# Patient Record
Sex: FEMALE | Race: WHITE | NOT HISPANIC OR LATINO
[De-identification: names, ages, dates, MRNs, and addresses within clinical notes are randomized per-mention and may not be internally consistent; named-entity substitution may affect disease eponyms.]

---

## 2019-01-23 PROBLEM — Z00.00 ENCOUNTER FOR PREVENTIVE HEALTH EXAMINATION: Status: ACTIVE | Noted: 2019-01-23

## 2019-01-24 ENCOUNTER — APPOINTMENT (OUTPATIENT)
Dept: NEUROSURGERY | Facility: CLINIC | Age: 69
End: 2019-01-24
Payer: MEDICARE

## 2019-01-24 VITALS
BODY MASS INDEX: 27.48 KG/M2 | TEMPERATURE: 97 F | SYSTOLIC BLOOD PRESSURE: 120 MMHG | HEIGHT: 60 IN | RESPIRATION RATE: 16 BRPM | DIASTOLIC BLOOD PRESSURE: 77 MMHG | OXYGEN SATURATION: 100 % | HEART RATE: 95 BPM | WEIGHT: 140 LBS

## 2019-01-24 DIAGNOSIS — M54.16 RADICULOPATHY, LUMBAR REGION: ICD-10-CM

## 2019-01-24 DIAGNOSIS — Z56.0 UNEMPLOYMENT, UNSPECIFIED: ICD-10-CM

## 2019-01-24 DIAGNOSIS — Z81.8 FAMILY HISTORY OF OTHER MENTAL AND BEHAVIORAL DISORDERS: ICD-10-CM

## 2019-01-24 DIAGNOSIS — Z82.49 FAMILY HISTORY OF ISCHEMIC HEART DISEASE AND OTHER DISEASES OF THE CIRCULATORY SYSTEM: ICD-10-CM

## 2019-01-24 DIAGNOSIS — Z83.3 FAMILY HISTORY OF DIABETES MELLITUS: ICD-10-CM

## 2019-01-24 DIAGNOSIS — Z86.59 PERSONAL HISTORY OF OTHER MENTAL AND BEHAVIORAL DISORDERS: ICD-10-CM

## 2019-01-24 DIAGNOSIS — Z86.69 PERSONAL HISTORY OF OTHER DISEASES OF THE NERVOUS SYSTEM AND SENSE ORGANS: ICD-10-CM

## 2019-01-24 PROCEDURE — 99205 OFFICE O/P NEW HI 60 MIN: CPT

## 2019-01-24 RX ORDER — TRAMADOL HYDROCHLORIDE 50 MG/1
50 TABLET, COATED ORAL
Qty: 60 | Refills: 0 | Status: ACTIVE | COMMUNITY
Start: 2019-01-24 | End: 1900-01-01

## 2019-01-24 SDOH — ECONOMIC STABILITY - INCOME SECURITY: UNEMPLOYMENT, UNSPECIFIED: Z56.0

## 2019-01-25 RX ORDER — OXYCODONE AND ACETAMINOPHEN 5; 325 MG/1; MG/1
5-325 TABLET ORAL
Qty: 20 | Refills: 0 | Status: ACTIVE | COMMUNITY
Start: 2019-01-25 | End: 1900-01-01

## 2019-01-28 ENCOUNTER — OUTPATIENT (OUTPATIENT)
Dept: OUTPATIENT SERVICES | Facility: HOSPITAL | Age: 69
LOS: 1 days | End: 2019-01-28
Payer: COMMERCIAL

## 2019-01-28 DIAGNOSIS — M54.5 LOW BACK PAIN: ICD-10-CM

## 2019-01-28 DIAGNOSIS — Z22.321 CARRIER OR SUSPECTED CARRIER OF METHICILLIN SUSCEPTIBLE STAPHYLOCOCCUS AUREUS: ICD-10-CM

## 2019-01-28 DIAGNOSIS — M54.16 RADICULOPATHY, LUMBAR REGION: ICD-10-CM

## 2019-01-28 DIAGNOSIS — M71.38 OTHER BURSAL CYST, OTHER SITE: ICD-10-CM

## 2019-01-28 PROBLEM — Z83.3 FAMILY HISTORY OF DIABETES MELLITUS: Status: ACTIVE | Noted: 2019-01-24

## 2019-01-28 PROBLEM — Z82.49 FAMILY HISTORY OF HYPERTENSION: Status: ACTIVE | Noted: 2019-01-24

## 2019-01-28 PROBLEM — Z86.69 HISTORY OF GLAUCOMA: Status: RESOLVED | Noted: 2019-01-24 | Resolved: 2019-01-28

## 2019-01-28 PROBLEM — Z86.59 HISTORY OF ANXIETY: Status: RESOLVED | Noted: 2019-01-24 | Resolved: 2019-01-28

## 2019-01-28 PROBLEM — Z56.0 NOT CURRENTLY WORKING DUE TO DISABLED STATUS: Status: ACTIVE | Noted: 2019-01-24

## 2019-01-28 PROBLEM — Z81.8 FAMILY HISTORY OF DEPRESSION: Status: ACTIVE | Noted: 2019-01-24

## 2019-01-28 LAB
MRSA PCR RESULT.: NEGATIVE — SIGNIFICANT CHANGE UP
S AUREUS DNA NOSE QL NAA+PROBE: NEGATIVE — SIGNIFICANT CHANGE UP

## 2019-01-28 PROCEDURE — 87641 MR-STAPH DNA AMP PROBE: CPT

## 2019-01-28 RX ORDER — ASPIRIN 81 MG/1
81 TABLET, COATED ORAL
Refills: 0 | Status: ACTIVE | COMMUNITY

## 2019-01-28 RX ORDER — ALPRAZOLAM 1 MG/1
1 TABLET, EXTENDED RELEASE ORAL
Refills: 0 | Status: ACTIVE | COMMUNITY

## 2019-01-28 RX ORDER — OXYCODONE HYDROCHLORIDE AND ACETAMINOPHEN 10; 325 MG/1; MG/1
TABLET ORAL
Refills: 0 | Status: ACTIVE | COMMUNITY

## 2019-02-05 NOTE — REVIEW OF SYSTEMS
[Feeling Poorly] : feeling poorly [Leg Weakness] : leg weakness [Numbness] : numbness [Tingling] : tingling [Abnormal Sensation] : an abnormal sensation [Difficulty Walking] : difficulty walking [Sleep Disturbances] : sleep disturbances [Anxiety] : anxiety [Arthralgias] : arthralgias [Joint Pain] : joint pain [Joint Stiffness] : joint stiffness [Limb Pain] : limb pain [Negative] : Heme/Lymph [Feeling Tired] : not feeling tired [Joint Swelling] : no joint swelling [Limb Swelling] : no limb swelling

## 2019-02-05 NOTE — ADDENDUM
[FreeTextEntry1] : January 24, 2019\par \par Tu Saldivar MD\par [address]\[place] [address]\par \par Re:	Kimberly Weber \par \par Dear Dr. Saldivar:\par \par I saw Kimberly in the office today.  As you know, she is a healthy 68-year-old female who presents with refractory right leg sciatic-type pain primarily in the right posterior thigh, foot, and ankle with MRI evidence of a right-sided synovial cyst with neural foraminal compromise.\par \par Kimberly reports that this pain has been intractable and severe and is a “20/10.”  This has been going on and getting worse since September despite conservative management including nonsteroidals and injections.  She really has no other complaints.  She denies any neurologic deficit or weakness.  Other than some mild anxiety and glaucoma, she has no other medical problems.  She does take Xanax as well as some low-dose aspirin.  She has had no prior surgery.  Other than her pain and some leg weakness, she denies any neurological deficit and certainly denies any left-sided symptoms.  She denies any cardiovascular, respiratory, or GI complaints.\par \par On exam, Kimberly has no gross neurological deficit.  Her dorsiflexion and plantar flexion are intact.  She does develop pain in the right leg with ankle flexion, however.  The remainder of her exam is noncontributory. \par \par I did have the opportunity to review her lumbar MRI, which does in fact show a sizable right-sided L5-S1 synovial cyst with neural foraminal compromise.  This is clearly her symptom generator, and in general, these sorts of lesions do not improve with conservative management.\par \par I had a long discussion with Kimberly regarding the best way to proceed.  We did review the risks, benefits, and alternatives to surgery.  Given the refractory nature of her pain and synovial cyst, I do think a simple lumbar decompression makes sense.  I think, given the severity of her pain, that she would like to get on with this as soon as possible, and we will make every effort to satisfy her in the near future.\par \par I will certainly keep you abreast of her progress and appreciate your kind referral.\par \par Sincerely,\par \par \par \par Rafiq Vila MD\par \par DL/ag [facility] #[id number]\par \par CC:	Gregg Fitzpatrick MD\par 	[address] [facility]\[place] [address]\par 	\par 	Kanwal Foley MD\par 	[address] [facility]\[place] [address]\par

## 2019-02-05 NOTE — HISTORY OF PRESENT ILLNESS
[FreeTextEntry1] : Severe RIGHT SIDED Lowerback and leg pain [de-identified] : THis is a 68 year old woman here for comprehensive neurosurgical evaluation of severe Right sided lowerback pain radiating into her right leg.  The pain is severe and is unrelieved with NSAIDs and Physical therapy. Her workup MRI revealed LARGE RIGHT SIDED SYNOVIAL CYST. She reports ongoing difficulty with daily functions. She reports trace Motor weakness in her RIGHT LEG.  The pain is interfering with her quality of life.

## 2019-02-05 NOTE — ASSESSMENT
[FreeTextEntry1] : 1. Surgery Recommended: Lumbar Decompression of Synovial Cyst.\par Pt Scheduled and ready to proceed.\par MRSA SWAB completed\par [facility] PRoxy Completed\par Education provided regarding plan of care

## 2019-02-05 NOTE — PHYSICAL EXAM
[General Appearance - Alert] : alert [General Appearance - In No Acute Distress] : in no acute distress [Oriented To Time, Place, And Person] : oriented to person, place, and time [Impaired Insight] : insight and judgment were intact [Cranial Nerves Optic (II)] : visual acuity intact bilaterally,  pupils equal round and reactive to light [Cranial Nerves Oculomotor (III)] : extraocular motion intact [Cranial Nerves Trigeminal (V)] : facial sensation intact symmetrically [Cranial Nerves Facial (VII)] : face symmetrical [Cranial Nerves Vestibulocochlear (VIII)] : hearing was intact bilaterally [Cranial Nerves Glossopharyngeal (IX)] : tongue and palate midline [Cranial Nerves Accessory (XI - Cranial And Spinal)] : head turning and shoulder shrug symmetric [Cranial Nerves Hypoglossal (XII)] : there was no tongue deviation with protrusion [Motor Strength] : muscle strength was normal in all four extremities [Sensation Tactile Decrease] : light touch was intact [Limited Balance] : the patient's balance was impaired [Sclera] : the sclera and conjunctiva were normal [] : no respiratory distress [Heart Rate And Rhythm] : heart rate was normal and rhythm regular [Abdomen Soft] : soft [Skin Color & Pigmentation] : normal skin color and pigmentation [FreeTextEntry6] : Trace Motor weakness right leg

## 2019-02-15 ENCOUNTER — INPATIENT (INPATIENT)
Facility: HOSPITAL | Age: 69
LOS: 0 days | Discharge: HOME | DRG: 517 | End: 2019-02-16
Attending: NEUROLOGICAL SURGERY | Admitting: NEUROLOGICAL SURGERY
Payer: MEDICARE

## 2019-02-15 ENCOUNTER — RESULT REVIEW (OUTPATIENT)
Age: 69
End: 2019-02-15

## 2019-02-15 ENCOUNTER — TRANSCRIPTION ENCOUNTER (OUTPATIENT)
Age: 69
End: 2019-02-15

## 2019-02-15 VITALS
SYSTOLIC BLOOD PRESSURE: 117 MMHG | HEIGHT: 61 IN | RESPIRATION RATE: 18 BRPM | HEART RATE: 87 BPM | DIASTOLIC BLOOD PRESSURE: 73 MMHG | TEMPERATURE: 97 F | WEIGHT: 142.64 LBS | OXYGEN SATURATION: 98 %

## 2019-02-15 DIAGNOSIS — Z98.890 OTHER SPECIFIED POSTPROCEDURAL STATES: Chronic | ICD-10-CM

## 2019-02-15 PROCEDURE — 63267 EXCISE INTRSPINL LESION LMBR: CPT

## 2019-02-15 PROCEDURE — 69990 MICROSURGERY ADD-ON: CPT

## 2019-02-15 RX ORDER — ALPRAZOLAM 0.25 MG
1 TABLET ORAL THREE TIMES A DAY
Qty: 0 | Refills: 0 | Status: DISCONTINUED | OUTPATIENT
Start: 2019-02-15 | End: 2019-02-16

## 2019-02-15 RX ORDER — BUPIVACAINE 13.3 MG/ML
20 INJECTION, SUSPENSION, LIPOSOMAL INFILTRATION ONCE
Qty: 0 | Refills: 0 | Status: DISCONTINUED | OUTPATIENT
Start: 2019-02-15 | End: 2019-02-16

## 2019-02-15 RX ORDER — DIAZEPAM 5 MG
5 TABLET ORAL EVERY 6 HOURS
Qty: 0 | Refills: 0 | Status: DISCONTINUED | OUTPATIENT
Start: 2019-02-15 | End: 2019-02-16

## 2019-02-15 RX ORDER — ONDANSETRON 8 MG/1
4 TABLET, FILM COATED ORAL EVERY 6 HOURS
Qty: 0 | Refills: 0 | Status: DISCONTINUED | OUTPATIENT
Start: 2019-02-15 | End: 2019-02-16

## 2019-02-15 RX ORDER — OXYCODONE HYDROCHLORIDE 5 MG/1
5 TABLET ORAL EVERY 4 HOURS
Qty: 0 | Refills: 0 | Status: DISCONTINUED | OUTPATIENT
Start: 2019-02-15 | End: 2019-02-16

## 2019-02-15 RX ORDER — MULTIVIT-MIN/FERROUS GLUCONATE 9 MG/15 ML
1 LIQUID (ML) ORAL DAILY
Qty: 0 | Refills: 0 | Status: DISCONTINUED | OUTPATIENT
Start: 2019-02-15 | End: 2019-02-16

## 2019-02-15 RX ORDER — METHYLPREDNISOLONE 4 MG
500 TABLET ORAL DAILY
Qty: 0 | Refills: 0 | Status: DISCONTINUED | OUTPATIENT
Start: 2019-02-15 | End: 2019-02-15

## 2019-02-15 RX ORDER — CEFAZOLIN SODIUM 1 G
2000 VIAL (EA) INJECTION EVERY 8 HOURS
Qty: 0 | Refills: 0 | Status: DISCONTINUED | OUTPATIENT
Start: 2019-02-15 | End: 2019-02-15

## 2019-02-15 RX ORDER — CEFAZOLIN SODIUM 1 G
2000 VIAL (EA) INJECTION EVERY 8 HOURS
Qty: 0 | Refills: 0 | Status: COMPLETED | OUTPATIENT
Start: 2019-02-15 | End: 2019-02-16

## 2019-02-15 RX ORDER — HYDROMORPHONE HYDROCHLORIDE 2 MG/ML
0.5 INJECTION INTRAMUSCULAR; INTRAVENOUS; SUBCUTANEOUS ONCE
Qty: 0 | Refills: 0 | Status: DISCONTINUED | OUTPATIENT
Start: 2019-02-15 | End: 2019-02-16

## 2019-02-15 RX ORDER — ACETAMINOPHEN 500 MG
650 TABLET ORAL EVERY 6 HOURS
Qty: 0 | Refills: 0 | Status: DISCONTINUED | OUTPATIENT
Start: 2019-02-15 | End: 2019-02-16

## 2019-02-15 RX ORDER — DEXTROSE MONOHYDRATE, SODIUM CHLORIDE, AND POTASSIUM CHLORIDE 50; .745; 4.5 G/1000ML; G/1000ML; G/1000ML
1000 INJECTION, SOLUTION INTRAVENOUS
Qty: 0 | Refills: 0 | Status: DISCONTINUED | OUTPATIENT
Start: 2019-02-15 | End: 2019-02-16

## 2019-02-15 RX ADMIN — Medication 2000 MILLIGRAM(S): at 16:07

## 2019-02-15 RX ADMIN — DEXTROSE MONOHYDRATE, SODIUM CHLORIDE, AND POTASSIUM CHLORIDE 75 MILLILITER(S): 50; .745; 4.5 INJECTION, SOLUTION INTRAVENOUS at 11:44

## 2019-02-15 RX ADMIN — DEXTROSE MONOHYDRATE, SODIUM CHLORIDE, AND POTASSIUM CHLORIDE 75 MILLILITER(S): 50; .745; 4.5 INJECTION, SOLUTION INTRAVENOUS at 23:28

## 2019-02-15 RX ADMIN — ONDANSETRON 4 MILLIGRAM(S): 8 TABLET, FILM COATED ORAL at 20:15

## 2019-02-15 RX ADMIN — Medication 1 MILLIGRAM(S): at 16:04

## 2019-02-15 RX ADMIN — Medication 2000 MILLIGRAM(S): at 23:28

## 2019-02-15 NOTE — H&P PST ADULT - ASSESSMENT
68 year old female here for right L5-S1 laminectomy for synovial cyst    -PACU then transfer to regional bed when stable  -Pain control  -Physical therapy

## 2019-02-15 NOTE — DISCHARGE NOTE ADULT - CARE PLAN
Principal Discharge DX:	Synovial cyst of lumbar spine  Goal:	resume ADLs  Assessment and plan of treatment:	call to schedule outpatient follow up with Dr. Vila in 10-14 days for wound check Principal Discharge DX:	Synovial cyst of lumbar spine  Goal:	resume ADLs  Assessment and plan of treatment:	Keep your wound clean and dry.  Once a day, ask a family member to check your incision for signs of infection such as: Redness, swelling, tenderness, drainage  • You may use stairs as tolerated.  • You may shower briefly, unless you told not to by your doctor.   Cover your entire incision with a waterproof bandage to make sure it does not get wet. If it gets wet, dry it off.   No tub baths or swimming for two weeks.  Call the office to schedule a outpatient follow-up with Dr. Vila in 10-14 days for wound check     Take pain medicine as prescribed.   You may find it helpful to take it for morning stiffness or for soreness when trying to sleep.  o Pain medication can cause constipation. Eating food with fiber such as fruits and  vegetables, and drinking liquids may help prevent constipation.  • Avoid bending, twisting or heavy lifting.  • Do not sit for more than 20 minutes each time you sit.  • Do not wear pants that are tight on your incision.  Call you doctor immediately if you have:  • Any new numbness, tingling, or weakness in your arms and legs. Worsening pain not responsive to pain meds, Fever of 101° F or more.

## 2019-02-15 NOTE — DISCHARGE NOTE ADULT - PLAN OF CARE
resume ADLs call to schedule outpatient follow up with Dr. Vila in 10-14 days for wound check Keep your wound clean and dry.  Once a day, ask a family member to check your incision for signs of infection such as: Redness, swelling, tenderness, drainage  • You may use stairs as tolerated.  • You may shower briefly, unless you told not to by your doctor.   Cover your entire incision with a waterproof bandage to make sure it does not get wet. If it gets wet, dry it off.   No tub baths or swimming for two weeks.  Call the office to schedule a outpatient follow-up with Dr. Vila in 10-14 days for wound check     Take pain medicine as prescribed.   You may find it helpful to take it for morning stiffness or for soreness when trying to sleep.  o Pain medication can cause constipation. Eating food with fiber such as fruits and  vegetables, and drinking liquids may help prevent constipation.  • Avoid bending, twisting or heavy lifting.  • Do not sit for more than 20 minutes each time you sit.  • Do not wear pants that are tight on your incision.  Call you doctor immediately if you have:  • Any new numbness, tingling, or weakness in your arms and legs. Worsening pain not responsive to pain meds, Fever of 101° F or more.

## 2019-02-15 NOTE — H&P PST ADULT - HISTORY OF PRESENT ILLNESS
This is a 68 year old woman with pmhx of anxiety and glaucoma. She p/w right low back pain with RLE radiculopathy. Pain is severe and unrelieved with NSAIDS or physical therapy. MRI revealed a right L5-S1 synovial cyst. She reports trace preoperative motor weakness in right leg. She is scheduled today for a R L5-S1 laminectomy for synovial cyst.

## 2019-02-15 NOTE — DISCHARGE NOTE ADULT - MEDICATION SUMMARY - MEDICATIONS TO TAKE
I will START or STAY ON the medications listed below when I get home from the hospital:    oxyCODONE 5 mg oral tablet  -- 2 tab(s) by mouth every 6 hours, As Needed -Moderate Pain (4 - 6) MDD:12 tabs  -- Indication: For Severe pain    diazePAM 5 mg oral tablet  -- 1 tab(s) by mouth every 6 hours, As needed, muscle spasm MDD:4 tabs  -- Indication: For Muscle spasm    Xanax 1 mg oral tablet  -- 1 tab(s) by mouth 3 times a day  -- Indication: For Anxiety    magnesium gluconate  -- Indication: For Nutritional    Centrum Silver oral tablet  -- 1 tab(s) by mouth once a day  -- Indication: For Bone strength

## 2019-02-15 NOTE — PROGRESS NOTE ADULT - SUBJECTIVE AND OBJECTIVE BOX
NEUROSURGERY POC:  S/P day 1 L5-S1 laminectomy for resection of synovial cyst. Patient in PACU without complaint. States that her pain level is zero at this time. States that pre op leg pain is resolved.    T(C): 36.5 (02-15-19 @ 12:00), Max: 36.5 (02-15-19 @ 12:00)  HR: 66 (02-15-19 @ 12:00) (66 - 96)  BP: 111/62 (02-15-19 @ 12:00) (102/59 - 117/73)  RR: 10 (02-15-19 @ 12:00) (10 - 20)  SpO2: 99% (02-15-19 @ 12:00) (97% - 100%)  Wt(kg): --    Exam: A&Ox3, PERRL, EOMI.  Back: Surgical dressing is dry and clean.  Ext: No focal  motor deficit, 5/5 x 4  No sensory deficit to touch.    Wound: dry and clean as above.    Imaging: none.    Assessment/Plan: 68 female s/p day zero L5-S1 laminectomy, resection of synovial cyst.  Plan: Pain control  OOB ambulate, PT.  discharge home tomorrow.  CHANCE. Dr. Vila.

## 2019-02-15 NOTE — DISCHARGE NOTE ADULT - PATIENT PORTAL LINK FT
You can access the [facility] Patient Portal, offered by [facility], by registering with the following website: [email or web address]

## 2019-02-15 NOTE — CONSULT NOTE ADULT - SUBJECTIVE AND OBJECTIVE BOX
Patient is a 68y old  Female who presents with a chief complaint of  back apin and radicular symptoms in the LE- no foot drop      HPI:      Allergies  No Known Allergies      Health Issues  SYNOCYST M71.38  Anxiety  Status post Mohs surgery  History of hand surgery        FAMILY HISTORY:      MEDICATIONS  (STANDING):    MEDICATIONS  (PRN):      PAST MEDICAL & SURGICAL HISTORY:  Anxiety  Status post Mohs surgery: left eye  History of hand surgery: right      Labs              Radiology:    Physical Exam    MENTAL STATUS  -Level of Consciousness- awake    Orientation- person, place time  Language- aphasia/ dysarthria- nl  Memory- recent and remote- nl      Cranial Nerve 1- 12  Pupils- equal and reactive  Eye movements- full  Facial - no asymmetry   Lower CN-nl    Gait and Station- no foot drop    MOTOR  Upper-nl  Lower- Left L5 minor weakness    Reflexes- intact    Sensation- no sensory level    Cerebellar- no tremors    vascular - no bruits    Assessment- Synovial cyst and radicular symptoms     Plan  Surgery as per Dr morgan

## 2019-02-15 NOTE — DISCHARGE NOTE ADULT - HOSPITAL COURSE
Patient admitted for elective lumbar decompression and resection of synovial cyst without complication Post op extubated and taken to PACU for observation. Patient later transferred to floor for observation and pain control overnight. Patient tolerating Po intake, ambulation and pain control. Patient cleared to [place] home instructed to follow up with Dr. Vila in 10-14 days for wound check. Patient admitted for elective lumbar decompression and resection of synovial cyst without complication. Post op extubated and taken to PACU for observation. Patient later transferred to floor for observation and pain control overnight. Patient tolerating PO intake, ambulation and pain control. Patient cleared to [place] home instructed to follow up with Dr. Vila in 10-14 days for wound check.

## 2019-02-15 NOTE — DISCHARGE NOTE ADULT - NS AS ACTIVITY OBS
Showering allowed/Walking-Indoors allowed/Bathing allowed/Walking-Outdoors allowed/Stairs allowed/No Heavy lifting/straining

## 2019-02-15 NOTE — DISCHARGE NOTE ADULT - CARE PROVIDER_API CALL
Rafiq Vila)  Neurological Surgery  [address], [place] [facility]  Phone: [phone]  Fax: [phone]  Follow Up Time:

## 2019-02-16 VITALS
SYSTOLIC BLOOD PRESSURE: 116 MMHG | RESPIRATION RATE: 15 BRPM | HEART RATE: 88 BPM | TEMPERATURE: 96 F | OXYGEN SATURATION: 97 % | DIASTOLIC BLOOD PRESSURE: 70 MMHG

## 2019-02-16 RX ORDER — OXYCODONE HYDROCHLORIDE 5 MG/1
2 TABLET ORAL
Qty: 56 | Refills: 0 | OUTPATIENT
Start: 2019-02-16 | End: 2019-02-22

## 2019-02-16 RX ORDER — DIAZEPAM 5 MG
1 TABLET ORAL
Qty: 28 | Refills: 0 | OUTPATIENT
Start: 2019-02-16 | End: 2019-02-22

## 2019-02-16 RX ADMIN — OXYCODONE HYDROCHLORIDE 5 MILLIGRAM(S): 5 TABLET ORAL at 08:59

## 2019-02-16 RX ADMIN — Medication 2000 MILLIGRAM(S): at 07:19

## 2019-02-16 RX ADMIN — OXYCODONE HYDROCHLORIDE 5 MILLIGRAM(S): 5 TABLET ORAL at 08:30

## 2019-02-16 NOTE — PROGRESS NOTE ADULT - SUBJECTIVE AND OBJECTIVE BOX
HPI:  This is a 68 year old woman with pmhx of anxiety and glaucoma. She p/w right low back pain with RLE radiculopathy. Pain is severe and unrelieved with NSAIDS or physical therapy. MRI revealed a right L5-S1 synovial cyst. She reports trace preoperative motor weakness in right leg. She is scheduled today for a R L5-S1 laminectomy for synovial cyst. (15 Feb 2019 10:25)      2/15/19: S/P day 1 L5-S1 laminectomy for resection of synovial cyst. Patient in PACU without complaint. States that her pain level is zero at this time. States that pre op leg pain is resolved.  2/16/19: RAVIN overnight. Neuro stable.    Vital Signs Last 24 Hrs  T(C): 35.6 (16 Feb 2019 05:04), Max: 37.3 (15 Feb 2019 19:06)  T(F): 96 (16 Feb 2019 05:04), Max: 99.2 (15 Feb 2019 19:06)  HR: 90 (16 Feb 2019 05:04) (66 - 108)  BP: 114/71 (16 Feb 2019 05:04) (102/59 - 144/75)  BP(mean): 77 (15 Feb 2019 17:00) (74 - 98)  RR: 16 (16 Feb 2019 05:04) (10 - 20)  SpO2: 96% (16 Feb 2019 05:04) (94% - 100%)    I&O's Summary    15 Feb 2019 07:01  -  16 Feb 2019 06:19  --------------------------------------------------------  IN: 885 mL / OUT: 700 mL / NET: 185 mL        PHYSICAL EXAM:  Neurological: AAOx3, FC, speech coherent  CNII-XII: PERRL, EOMI, face symmetric  Motor: MAEx4 5/5 UE and LE B/L  Back: Surgical dressing is dry and clean.  SILT throughout    TUBES/LINES:  [] Morales  [] Lumbar Drain  [] Wound Drains  [] Others    DIET:  [] NPO  [x] Mechanical  [] Tube feeds    LABS:        CAPILLARY BLOOD GLUCOSE          Drug Levels: [] N/A    CSF Analysis: [] N/A      Allergies    No Known Allergies    Intolerances      MEDICATIONS:  Antibiotics:  ceFAZolin  Injectable. 2000 milliGRAM(s) IV Push every 8 hours    Neuro:  acetaminophen   Tablet .. 650 milliGRAM(s) Oral every 6 hours PRN  ALPRAZolam 1 milliGRAM(s) Oral three times a day PRN  diazepam    Tablet 5 milliGRAM(s) Oral every 6 hours PRN  HYDROmorphone  Injectable 0.5 milliGRAM(s) IV Push once PRN  ondansetron Injectable 4 milliGRAM(s) IV Push every 6 hours PRN  oxyCODONE    IR 5 milliGRAM(s) Oral every 4 hours PRN    Anticoagulation:    OTHER:  BUpivacaine liposome 1.3% Injectable (no eMAR) 20 milliLiter(s) Local Injection once    IVF:  multivitamin/minerals 1 Tablet(s) Oral daily  sodium chloride 0.9% with potassium chloride 20 mEq/L 1000 milliLiter(s) IV Continuous <Continuous>    CULTURES:    RADIOLOGY & ADDITIONAL TESTS:      ASSESSMENT:  68 female s/p day zero L5-S1 laminectomy, resection of synovial cyst.    PLAN:  -spinal checks  -pain control: valium, oxycodone  -PT/OOB  -DVT ppx: SCDs  -enocurage IS  -bowel regimen  -ADAT  -continue postop ancef  -Dispo pending  -D/w Dr. Vila

## 2019-02-16 NOTE — PHYSICAL THERAPY INITIAL EVALUATION ADULT - PERTINENT HX OF CURRENT PROBLEM, REHAB EVAL
his is a 68 year old woman with pmhx of anxiety and glaucoma. She p/w right low back pain with RLE radiculopathy. Pain is severe and unrelieved with NSAIDS or physical therapy. MRI revealed a right L5-S1 synovial cyst. She reports trace preoperative motor weakness in right leg. She is scheduled today for a R L5-S1 laminectomy for synovial cyst.

## 2019-02-16 NOTE — PROGRESS NOTE ADULT - SUBJECTIVE AND OBJECTIVE BOX
Neurology Follow up note    Name  VALERIA SO    HPI:  This is a 68 year old woman with pmhx of anxiety and glaucoma. She p/w right low back pain with RLE radiculopathy. Pain is severe and unrelieved with NSAIDS or physical therapy. MRI revealed a right L5-S1 synovial cyst. She reports trace preoperative motor weakness in right leg. She is scheduled today for a R L5-S1 laminectomy for synovial cyst. (15 Feb 2019 10:25)      Interval History - back pain - no new radicular symptoms in the LE        REVIEW OF SYSTEMS    Vital Signs Last 24 Hrs  T(C): 35.6 (16 Feb 2019 08:21), Max: 37.3 (15 Feb 2019 19:06)  T(F): 96 (16 Feb 2019 08:21), Max: 99.2 (15 Feb 2019 19:06)  HR: 88 (16 Feb 2019 08:21) (77 - 108)  BP: 116/70 (16 Feb 2019 08:21) (104/64 - 144/75)  BP(mean): 77 (15 Feb 2019 17:00) (74 - 98)  RR: 15 (16 Feb 2019 08:21) (11 - 19)  SpO2: 97% (16 Feb 2019 08:21) (94% - 99%)    Physical Exam-     Mental Status- awake and alert    Cranial Nerves- full EOM    Gait and station- n/a    Motor- moves all 4 extremities    Reflexes- decreased    Sensation- no sensory level    Coordination- no tremors    Vascular - no bruits    Medications  acetaminophen   Tablet .. 650 milliGRAM(s) Oral every 6 hours PRN  ALPRAZolam 1 milliGRAM(s) Oral three times a day PRN  BUpivacaine liposome 1.3% Injectable (no eMAR) 20 milliLiter(s) Local Injection once  diazepam    Tablet 5 milliGRAM(s) Oral every 6 hours PRN  HYDROmorphone  Injectable 0.5 milliGRAM(s) IV Push once PRN  multivitamin/minerals 1 Tablet(s) Oral daily  ondansetron Injectable 4 milliGRAM(s) IV Push every 6 hours PRN  oxyCODONE    IR 5 milliGRAM(s) Oral every 4 hours PRN      Lab      Radiology    Assessment- Synovial cyst    Plan as per NS- no neuro deficits

## 2019-02-16 NOTE — PHYSICAL THERAPY INITIAL EVALUATION ADULT - ADDITIONAL COMMENTS
Patient lives in [facility], 3 flights of stairs. Patient denies home health assistance, DME, or history of falls.

## 2019-02-18 PROCEDURE — 86850 RBC ANTIBODY SCREEN: CPT

## 2019-02-18 PROCEDURE — C1889: CPT

## 2019-02-18 PROCEDURE — 86901 BLOOD TYPING SEROLOGIC RH(D): CPT

## 2019-02-18 PROCEDURE — 97161 PT EVAL LOW COMPLEX 20 MIN: CPT

## 2019-02-18 PROCEDURE — 76000 FLUOROSCOPY <1 HR PHYS/QHP: CPT

## 2019-02-18 PROCEDURE — 88304 TISSUE EXAM BY PATHOLOGIST: CPT

## 2019-02-18 PROCEDURE — 86900 BLOOD TYPING SEROLOGIC ABO: CPT

## 2019-02-19 PROBLEM — F41.9 ANXIETY DISORDER, UNSPECIFIED: Chronic | Status: ACTIVE | Noted: 2019-02-14

## 2019-02-21 DIAGNOSIS — M71.38 OTHER BURSAL CYST, OTHER SITE: ICD-10-CM

## 2019-02-21 DIAGNOSIS — F41.9 ANXIETY DISORDER, UNSPECIFIED: ICD-10-CM

## 2019-02-21 DIAGNOSIS — H40.9 UNSPECIFIED GLAUCOMA: ICD-10-CM

## 2019-02-22 LAB — SURGICAL PATHOLOGY STUDY: SIGNIFICANT CHANGE UP

## 2019-02-27 ENCOUNTER — APPOINTMENT (OUTPATIENT)
Dept: NEUROSURGERY | Facility: CLINIC | Age: 69
End: 2019-02-27
Payer: MEDICARE

## 2019-02-27 VITALS
SYSTOLIC BLOOD PRESSURE: 136 MMHG | TEMPERATURE: 97.5 F | WEIGHT: 140 LBS | HEART RATE: 95 BPM | OXYGEN SATURATION: 100 % | BODY MASS INDEX: 27.48 KG/M2 | HEIGHT: 60 IN | RESPIRATION RATE: 16 BRPM | DIASTOLIC BLOOD PRESSURE: 82 MMHG

## 2019-02-27 PROCEDURE — 99024 POSTOP FOLLOW-UP VISIT: CPT

## 2019-03-01 NOTE — REASON FOR VISIT
[de-identified] : S/P L5-S1 LAMINOTOMY with resection of synovial cyst [de-identified] : 2/15/19 [de-identified] : doing well with improved/resolved leg pain.\par ambulating without difficulty.\par Lumbar incision healing well without signs of infection.

## 2019-03-01 NOTE — ASSESSMENT
[FreeTextEntry1] : S/P Laminotomy\par Doing well post op \par Wound edges approximated without signs of infection.\par Pain Well controlled with post op medications.\par Scheduled to follow up with Dr. Vila\par \par

## 2019-03-01 NOTE — PHYSICAL EXAM
[General Appearance - Alert] : alert [General Appearance - In No Acute Distress] : in no acute distress [Longitudinal] : longitudinal [Clean] : clean [Dry] : dry [Well-Healed] : well-healed [No Drainage] : without drainage [Normal Skin] : normal [Oriented To Time, Place, And Person] : oriented to person, place, and time [Impaired Insight] : insight and judgment were intact [I: Normal Smell] : smell intact bilaterally [Cranial Nerves Optic (II)] : visual acuity intact bilaterally,  pupils equal round and reactive to light [Cranial Nerves Oculomotor (III)] : extraocular motion intact [Cranial Nerves Trigeminal (V)] : facial sensation intact symmetrically [Cranial Nerves Facial (VII)] : face symmetrical [Cranial Nerves Glossopharyngeal (IX)] : tongue and palate midline [Cranial Nerves Accessory (XI - Cranial And Spinal)] : head turning and shoulder shrug symmetric [Cranial Nerves Hypoglossal (XII)] : there was no tongue deviation with protrusion [Motor Tone] : muscle tone was normal in all four extremities [Motor Strength] : muscle strength was normal in all four extremities [Motor Handedness Right-Handed] : the patient is right hand dominant [Sensation Tactile Decrease] : light touch was intact [Balance] : balance was intact [No Visual Abnormalities] : no visible abnormailities [Sclera] : the sclera and conjunctiva were normal [Outer Ear] : the ears and nose were normal in appearance [Neck Appearance] : the appearance of the neck was normal [] : no respiratory distress [Heart Rate And Rhythm] : heart rate was normal and rhythm regular [Abdomen Soft] : soft [Abnormal Walk] : normal gait [Skin Color & Pigmentation] : normal skin color and pigmentation [FreeTextEntry1] : lumbar

## 2019-03-25 PROBLEM — M54.5 LUMBAR BACK PAIN: Status: ACTIVE | Noted: 2019-01-24

## 2019-03-25 PROBLEM — Z09 POSTOP CHECK: Status: ACTIVE | Noted: 2019-03-01

## 2019-03-25 PROBLEM — M71.38 SYNOVIAL CYST OF LUMBAR SPINE: Status: ACTIVE | Noted: 2019-01-24

## 2019-03-28 ENCOUNTER — APPOINTMENT (OUTPATIENT)
Dept: NEUROSURGERY | Facility: CLINIC | Age: 69
End: 2019-03-28
Payer: MEDICARE

## 2019-03-28 VITALS
OXYGEN SATURATION: 99 % | DIASTOLIC BLOOD PRESSURE: 76 MMHG | HEIGHT: 60 IN | WEIGHT: 140 LBS | HEART RATE: 77 BPM | BODY MASS INDEX: 27.48 KG/M2 | TEMPERATURE: 97.1 F | SYSTOLIC BLOOD PRESSURE: 112 MMHG | RESPIRATION RATE: 18 BRPM

## 2019-03-28 DIAGNOSIS — Z09 ENCOUNTER FOR FOLLOW-UP EXAMINATION AFTER COMPLETED TREATMENT FOR CONDITIONS OTHER THAN MALIGNANT NEOPLASM: ICD-10-CM

## 2019-03-28 DIAGNOSIS — M71.38 OTHER BURSAL CYST, OTHER SITE: ICD-10-CM

## 2019-03-28 DIAGNOSIS — M54.5 LOW BACK PAIN: ICD-10-CM

## 2019-03-28 PROCEDURE — 99024 POSTOP FOLLOW-UP VISIT: CPT

## 2019-04-02 NOTE — REASON FOR VISIT
[de-identified] : S/P L5-S1 LAMINOTOMY with resection of synovial cyst [de-identified] : 2/15/19 [de-identified] : doing well with improved/resolved leg pain.\par ambulating without difficulty.\par Lumbar incision healing well without signs of infection.

## 2019-04-02 NOTE — ADDENDUM
[FreeTextEntry1] : \par Tu Saldivar MD\par [address]\[place] [address]\par \par Re:	Kimberly Weber \par \par Dear Dr. Saldivar:\par \par I saw Kimberly in followup.  I am happy to say that her leg pain is resolved completely, although she continues to have low-grade back pain especially with prolonged sitting.  I am concerned that Kimberly’s mechanical issues which were not addressed by the synovial cyst resection could be persistent, and while I am hopeful that this will resolve over time, it is entirely possible that she could be left with a back pain syndrome given the presence of the synovial cyst.  These cysts are often caused by mechanical issues in the low back, and while removing the cyst treats her neural compression and leg pain, we did not address her mechanical issue at all.  I am hopeful that with time this will resolve, but clearly if her back pain becomes more disabling, a lumbar fusion could be necessary, but I am very hopeful we can avoid this.  I told Kimberly so.  I will be seeing her again in 3 months’ time and I will certainly keep a close eye on her progress.  She is much improved from surgery, where she was having “20 out of 10 pain,” but for the time being, we just need to keep an eye and hope that she remains stable.\par \par Thanks so much for your kind referral. \par \par Sincerely,\par \par \par \par Rafiq Vila MD\par \par DL/ag DocuMed #[id number]\par \par \par CC:	Gregg Fitzpatrick MD\par 	[address] [facility]\[place] [address]\par 	\par 	Kanwal Foley MD\par 	[address] [place] [address]\par

## 2019-04-02 NOTE — ASSESSMENT
[FreeTextEntry1] : Doing well post op \par Wound edges approximated without signs of infection.\par Pain Well controlled with post op medications.\par Scheduled to follow up with Dr. Vila\par

## 2019-04-11 NOTE — PHYSICAL EXAM
[General Appearance - Alert] : alert [General Appearance - In No Acute Distress] : in no acute distress [Longitudinal] : longitudinal [Dry] : dry [Clean] : clean [Well-Healed] : well-healed [No Drainage] : without drainage [Normal Skin] : normal [Impaired Insight] : insight and judgment were intact [FreeTextEntry1] : lumbar  [Oriented To Time, Place, And Person] : oriented to person, place, and time [Cranial Nerves Oculomotor (III)] : extraocular motion intact [I: Normal Smell] : smell intact bilaterally [Cranial Nerves Optic (II)] : visual acuity intact bilaterally,  pupils equal round and reactive to light [Cranial Nerves Facial (VII)] : face symmetrical [Cranial Nerves Glossopharyngeal (IX)] : tongue and palate midline [Cranial Nerves Trigeminal (V)] : facial sensation intact symmetrically [Motor Tone] : muscle tone was normal in all four extremities [Cranial Nerves Hypoglossal (XII)] : there was no tongue deviation with protrusion [Cranial Nerves Accessory (XI - Cranial And Spinal)] : head turning and shoulder shrug symmetric [Motor Strength] : muscle strength was normal in all four extremities [Sensation Tactile Decrease] : light touch was intact [Motor Handedness Right-Handed] : the patient is right hand dominant [Balance] : balance was intact [Sclera] : the sclera and conjunctiva were normal [No Visual Abnormalities] : no visible abnormailities [Neck Appearance] : the appearance of the neck was normal [Outer Ear] : the ears and nose were normal in appearance [] : no respiratory distress [Heart Rate And Rhythm] : heart rate was normal and rhythm regular [Abnormal Walk] : normal gait [Abdomen Soft] : soft [Skin Color & Pigmentation] : normal skin color and pigmentation

## 2019-04-11 NOTE — REASON FOR VISIT
[de-identified] : S/P L5-S1 LAMINOTOMY with resection of synovial cyst [de-identified] : 2/15/19 [de-identified] : doing well with improved/resolved leg pain.\par ambulating without difficulty.\par Lumbar incision healing well without signs of infection.

## 2019-04-11 NOTE — ADDENDUM
[FreeTextEntry1] : \par Tu Saldivar MD\par [address]\[place] [address]\par \par Re:	Kimberly Weber \par \par Dear Dr. Saldivar:\par \par I saw Kimberly in followup.  I am happy to say that her leg pain is resolved completely, although she continues to have low-grade back pain especially with prolonged sitting.  I am concerned that Kimberly’s mechanical issues which were not addressed by the synovial cyst resection could be persistent, and while I am hopeful that this will resolve over time, it is entirely possible that she could be left with a back pain syndrome given the presence of the synovial cyst.  These cysts are often caused by mechanical issues in the low back, and while removing the cyst treats her neural compression and leg pain, we did not address her mechanical issue at all.  I am hopeful that with time this will resolve, but clearly if her back pain becomes more disabling, a lumbar fusion could be necessary, but I am very hopeful we can avoid this.  I told Kimberly so.  I will be seeing her again in 3 months’ time and I will certainly keep a close eye on her progress.  She is much improved from surgery, where she was having “20 out of 10 pain,” but for the time being, we just need to keep an eye and hope that she remains stable.\par \par Thanks so much for your kind referral. \par \par Sincerely,\par \par \par \par Rafiq Vila MD\par \par DL/ag [facility] #[id number]\par \par \par CC:	Gregg Fitzpatrick MD\par 	[address] [facility]\par 	[place] [address]\par 	\par 	Kanwal Foley MD\par 	[address] [place] [address]\par

## 2019-04-11 NOTE — ADDENDUM
[FreeTextEntry1] : \par Tu Saldivar MD\par [address]\[place] [address]\par \par Re:	Kimberly Weber \par \par Dear Dr. Saldivar:\par \par I saw Kimberly in followup.  I am happy to say that her leg pain is resolved completely, although she continues to have low-grade back pain especially with prolonged sitting.  I am concerned that Kimberly’s mechanical issues which were not addressed by the synovial cyst resection could be persistent, and while I am hopeful that this will resolve over time, it is entirely possible that she could be left with a back pain syndrome given the presence of the synovial cyst.  These cysts are often caused by mechanical issues in the low back, and while removing the cyst treats her neural compression and leg pain, we did not address her mechanical issue at all.  I am hopeful that with time this will resolve, but clearly if her back pain becomes more disabling, a lumbar fusion could be necessary, but I am very hopeful we can avoid this.  I told Kimberly so.  I will be seeing her again in 3 months’ time and I will certainly keep a close eye on her progress.  She is much improved from surgery, where she was having “20 out of 10 pain,” but for the time being, we just need to keep an eye and hope that she remains stable.\par \par Thanks so much for your kind referral. \par \par Sincerely,\par \par \par \par Rafiq Vila MD\par \par DL/ag [facility] #[id number]\par \par \par CC:	Gregg Fitzpatrick MD\par 	[address] [facility]\[place] 	[place] [address]\par 	\par 	Kanwal Foley MD\par 	[address] [place] [address]\par

## 2019-04-11 NOTE — REASON FOR VISIT
[de-identified] : 2/15/19 [de-identified] : S/P L5-S1 LAMINOTOMY with resection of synovial cyst [de-identified] : doing well with improved/resolved leg pain.\par ambulating without difficulty.\par Lumbar incision healing well without signs of infection.

## 2019-04-11 NOTE — PHYSICAL EXAM
[Longitudinal] : longitudinal [General Appearance - In No Acute Distress] : in no acute distress [General Appearance - Alert] : alert [Clean] : clean [Well-Healed] : well-healed [Dry] : dry [Normal Skin] : normal [No Drainage] : without drainage [Impaired Insight] : insight and judgment were intact [Oriented To Time, Place, And Person] : oriented to person, place, and time [FreeTextEntry1] : lumbar  [I: Normal Smell] : smell intact bilaterally [Cranial Nerves Optic (II)] : visual acuity intact bilaterally,  pupils equal round and reactive to light [Cranial Nerves Oculomotor (III)] : extraocular motion intact [Cranial Nerves Trigeminal (V)] : facial sensation intact symmetrically [Cranial Nerves Glossopharyngeal (IX)] : tongue and palate midline [Cranial Nerves Facial (VII)] : face symmetrical [Motor Tone] : muscle tone was normal in all four extremities [Cranial Nerves Hypoglossal (XII)] : there was no tongue deviation with protrusion [Cranial Nerves Accessory (XI - Cranial And Spinal)] : head turning and shoulder shrug symmetric [Motor Handedness Right-Handed] : the patient is right hand dominant [Sensation Tactile Decrease] : light touch was intact [Motor Strength] : muscle strength was normal in all four extremities [Balance] : balance was intact [No Visual Abnormalities] : no visible abnormailities [Sclera] : the sclera and conjunctiva were normal [Neck Appearance] : the appearance of the neck was normal [Outer Ear] : the ears and nose were normal in appearance [] : no respiratory distress [Heart Rate And Rhythm] : heart rate was normal and rhythm regular [Abdomen Soft] : soft [Abnormal Walk] : normal gait [Skin Color & Pigmentation] : normal skin color and pigmentation

## 2020-05-26 NOTE — PRE-OP CHECKLIST - TAMPON REMOVED
Called to discuss positive Chlamydia results. [address] [facility]. Health Department tracking form completed and faxed to D.
n/a

## 2024-08-13 ENCOUNTER — APPOINTMENT (RX ONLY)
Dept: URBAN - METROPOLITAN AREA CLINIC 102 | Facility: CLINIC | Age: 74
Setting detail: DERMATOLOGY
End: 2024-08-13

## 2024-08-13 DIAGNOSIS — Z41.9 ENCOUNTER FOR PROCEDURE FOR PURPOSES OTHER THAN REMEDYING HEALTH STATE, UNSPECIFIED: ICD-10-CM

## 2024-08-13 PROCEDURE — ? OTHER

## 2024-08-13 PROCEDURE — ? PRODUCT LINE (ZO)

## 2024-08-13 PROCEDURE — ? BOTOX (U OR CC)

## 2024-08-13 ASSESSMENT — LOCATION SIMPLE DESCRIPTION DERM
LOCATION SIMPLE: RIGHT FOREHEAD
LOCATION SIMPLE: GLABELLA
LOCATION SIMPLE: LEFT FOREHEAD
LOCATION SIMPLE: RIGHT CHEEK
LOCATION SIMPLE: RIGHT NOSE
LOCATION SIMPLE: NOSE

## 2024-08-13 ASSESSMENT — LOCATION ZONE DERM
LOCATION ZONE: FACE
LOCATION ZONE: NOSE
LOCATION ZONE: FACE

## 2024-08-13 ASSESSMENT — LOCATION DETAILED DESCRIPTION DERM
LOCATION DETAILED: LEFT INFERIOR MEDIAL FOREHEAD
LOCATION DETAILED: RIGHT INFERIOR MEDIAL FOREHEAD
LOCATION DETAILED: RIGHT FOREHEAD
LOCATION DETAILED: RIGHT MEDIAL FOREHEAD
LOCATION DETAILED: LEFT FOREHEAD
LOCATION DETAILED: LEFT MEDIAL FOREHEAD
LOCATION DETAILED: RIGHT NASAL SIDEWALL
LOCATION DETAILED: NASAL DORSUM
LOCATION DETAILED: GLABELLA
LOCATION DETAILED: RIGHT INFERIOR MEDIAL MALAR CHEEK

## 2024-08-13 NOTE — PROCEDURE: BOTOX (U OR CC)
Glabellar Complex Units: 20
Inferior Lateral Orbicularis Oculi Units: 0
Dilution (U/0.1 Cc): 2.5
Expiration Date (Month Year): 11/2024
Post-Care Instructions: Patient instructed to not lie down for 4 hours and limit physical activity for 24 hours. Patient instructed not to travel by airplane for 48 hours.
Price (Use Numbers Only, No Special Characters Or $): 879
Detail Level: Detailed
Document As Units Or Cc?: units
Forehead Units/Cc: 14
Lot #: [id number]
Consent: Written consent obtained. Risks include but not limited to lid/brow ptosis, bruising, swelling, diplopia, temporary effect, incomplete chemical denervation.
Including Pricing Information In The Note: No
Nasal Root Units/Cc: 6
Quantity Per Injection Site (Units Or Cc): 4 U
Quantity Per Injection Site (Units Or Cc): 2 U

## 2024-08-13 NOTE — PROCEDURE: PRODUCT LINE (ZO)
Product 19 Units: 0
Allow Plan To Count Towards E/M Coding: Yes
Product 2 Price (In Dollars - Numeric Only, No Special Characters Or $): 0.00
Send Charges To Patient Encounter: No
Name Of Product 1: Cleanser
Product 1 Price (In Dollars - Numeric Only, No Special Characters Or $): 36
Detail Level: Zone
Product 1 Application Directions: Cleanse morning, and night rinse and pat dry
Assigning Risk Information: Per AMA, level of risk is based upon consequences of the problem(s) addressed at the encounter when appropriately treated. Risk also includes medical decision making related to the need to initiate or forego further testing, treatment and/or hospitalization. Over the counter medication are assigned a risk level of low. Prescription medication management is assigned a risk level of moderate.
Risk Of Complication Category: No MDM

## 2024-08-13 NOTE — PROCEDURE: OTHER
Note Text (......Xxx Chief Complaint.): This diagnosis correlates with the
Other (Free Text): Patient received 30% off. Patient is Dr. Ortiz sister
Detail Level: Zone
Render Risk Assessment In Note?: no

## 2024-09-16 ENCOUNTER — RX ONLY (OUTPATIENT)
Age: 74
Setting detail: RX ONLY
End: 2024-09-16

## 2024-09-16 RX ORDER — AZITHROMYCIN DIHYDRATE 250 MG/1
TABLET, FILM COATED ORAL
Qty: 6 | Refills: 0 | Status: ERX | COMMUNITY
Start: 2024-09-16

## 2024-10-08 NOTE — DISCHARGE NOTE ADULT - NS AS DC FU INST LIST INST
She was discharged from the hospital, has scheduled apt with Dr. Khan on 10/10. Ad Med Team- please let us know the plan and if Mariluz will need follow up at [facility]   no

## 2025-02-08 NOTE — PATIENT PROFILE ADULT - BRADEN ACTIVITY
Push fluids.   Amoxicillin 400mg/5ml, 8.5ml twice a day for 10 days.   Tylenol and ibuprofen for pain and fevers.    
(4) walks frequently